# Patient Record
Sex: MALE | Race: WHITE | NOT HISPANIC OR LATINO | URBAN - METROPOLITAN AREA
[De-identification: names, ages, dates, MRNs, and addresses within clinical notes are randomized per-mention and may not be internally consistent; named-entity substitution may affect disease eponyms.]

---

## 2020-07-01 ENCOUNTER — PATIENT MESSAGE (OUTPATIENT)
Dept: ENDOCRINOLOGY | Facility: CLINIC | Age: 20
End: 2020-07-01

## 2020-07-02 NOTE — TELEPHONE ENCOUNTER
Spoke with Ollie to schedule virtual visit per Dr. Horton instructions. They were okay with this change and have an active patient portal. The virtual visit was scheduled by Suzanna, as I do not have access to override. The patient is aware of time and date of appointment and has the [employer] lobo to complete visit. They had no questions and have been given clinic contact information incase they have any future questions.

## 2020-07-08 ENCOUNTER — OFFICE VISIT (OUTPATIENT)
Dept: ENDOCRINOLOGY | Facility: CLINIC | Age: 20
End: 2020-07-08
Attending: INTERNAL MEDICINE
Payer: COMMERCIAL

## 2020-07-08 DIAGNOSIS — F41.9 ANXIETY: ICD-10-CM

## 2020-07-08 PROCEDURE — 99203 OFFICE O/P NEW LOW 30 MIN: CPT | Mod: 95,,, | Performed by: INTERNAL MEDICINE

## 2020-07-08 PROCEDURE — 99203 PR OFFICE/OUTPT VISIT, NEW, LEVL III, 30-44 MIN: ICD-10-PCS | Mod: 95,,, | Performed by: INTERNAL MEDICINE

## 2020-07-08 RX ORDER — ESTRADIOL 1 MG/1
1 TABLET ORAL 2 TIMES DAILY
Qty: 60 TABLET | Refills: 5 | Status: SHIPPED | OUTPATIENT
Start: 2020-07-08 | End: 2020-11-16

## 2020-07-08 RX ORDER — ESCITALOPRAM OXALATE 10 MG/1
TABLET ORAL DAILY
COMMUNITY
End: 2021-02-22

## 2020-07-08 RX ORDER — SPIRONOLACTONE 25 MG/1
25 TABLET ORAL 2 TIMES DAILY
Qty: 60 TABLET | Refills: 6 | Status: SHIPPED | OUTPATIENT
Start: 2020-07-08 | End: 2020-11-16

## 2020-07-08 NOTE — ASSESSMENT & PLAN NOTE
Transwoman: gender incongruence   Reviewed therapy, side effects (both wanted and unwanted), possible adverse outcomes, expectations, compliance. Reviewed limited data on fertility, expect decline over time... consider sperm banking.   Patient encouraged to continue working with her therapist during the transition process.      Will start estrogen replacement therapy    Estradiol 1 mg po bid     Will start aldactone 25 mg bid           RTO in 2- 3 months with labs .  Healthy lifestyle stressed  Discussed increased risk of dvt   Avoid prolonged immobility  D/c ert prior to any procedures

## 2020-07-08 NOTE — PROGRESS NOTES
Subjective:      Patient ID: Ollie Mccabe is a 19 y.o. male.    Chief Complaint:  Gender Identity Disorder      History of Present Illness  Was seen at [facility]       With regards to her gender incongruence care:  Queer   They/them   Would like to have their body match their identity better       Gender identity - female  Current Therapy / counselor - yes    the are supportive     Gender Surgeries - not at this time   Fertility concerns - not  interested    Has not started cross hormone therapy:      Social:  Work -  In school - [employer] --senior    Majoring in environmental biology     Family -  Aware and supportive   Relationship, orientation - in relationship with woman   She is supportive   Erections are not impatrot to the [facility]     Housing - lives alone       Anxiety is stable     New concerns today:     they are not hypermobile       Review of Systems   Constitutional: Negative for unexpected weight change.   Eyes: Negative for visual disturbance.   Respiratory: Negative for shortness of breath.    Cardiovascular: Negative for chest pain.   Gastrointestinal: Negative for abdominal pain.   Musculoskeletal: Negative for myalgias.   Skin: Negative for wound.   Neurological: Negative for headaches.   Hematological: Does not bruise/bleed easily.   Psychiatric/Behavioral: Negative for sleep disturbance.       Objective:     There were no vitals taken for this visit.  BP Readings from Last 3 Encounters:   No data found for BP     Wt Readings from Last 1 Encounters:   No data found for Wt     There is no height or weight on file to calculate BMI.      Physical Exam  Constitutional:       Appearance: Normal appearance.   Psychiatric:         Attention and Perception: Attention normal.         Speech: Speech normal.         Behavior: Behavior normal.         Thought Content: Thought content normal.         Judgment: Judgment normal.                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 Reviewed limited data on fertility, expect decline over time... consider sperm banking.   Patient encouraged to continue working with her therapist during the transition process.      Will start estrogen replacement therapy    Estradiol 1 mg po bid     Will start aldactone 25 mg bid           RTO in 2- 3 months with labs .  Healthy lifestyle stressed  Discussed increased risk of dvt   Avoid prolonged immobility  D/c ert prior to any procedures          Anxiety  ert can impact mood  F/u [facility]     The patient location is: home  The chief complaint leading to consultation is: gender    Visit type: audiovisual    Face to Face time with patient: 30 minutes of total time spent on the encounter, which includes face to face time and non-face to face time preparing to see the patient (eg, review of tests), Obtaining and/or reviewing separately obtained history, Documenting clinical information in the electronic or other health record, Independently interpreting results (not separately reported) and communicating results to the patient/family/caregiver, or Care coordination (not separately reported).         Each patient to whom he or she provides medical services by telemedicine is:  (1) informed of the relationship between the physician and patient and the respective role of any other health care provider with respect to management of the patient; and (2) notified that he or she may decline to receive medical services by telemedicine and may withdraw from such care at any  time.      Time spent with patient 30 minutes with > 50% spent in counseling

## 2020-07-08 NOTE — PATIENT INSTRUCTIONS
Thank you for completing a virtual visit with me!     Per our conversation, we will start low-dose estrogen and spironolactone.    We will plan a telemedicine or an in-clinic visit in 2-3  months with labs prior to that appointment.    My staff will contact you to schedule the above.     Please let me know if you have any other questions.    Thank you,  Reyna Horton MD

## 2020-07-08 NOTE — LETTER
July 8, 2020      Sandra Cortez MD  [address] [facility] [address] [facility]  [address]  [facility] [address]           [facility] End[facility]inology [address] [place]  [address]  [facility] [address]  Phone: [phone]  Fax: [phone]          Patient: Ollie Mccabe   MR Number: [id number]   YOB: 2000   Date of Visit: 7/8/2020       Dear Dr. Sandra Cortez:    Thank you for referring Ollie Mccabe to me for evaluation. Attached you will find relevant portions of my assessment and plan of care.    If you have questions, please do not hesitate to call me. I look forward to following Ollie Mccabe along with you.    Sincerely,    Reyna Horton MD    Enclosure  CC:  No Recipients    If you would like to receive this communication electronically, please contact [email or web address] or [phone] to request more information on [employer] access.    For providers and/or their staff who would like to refer a patient to [facility], please contact us through our one-stop-shop provider referral line, [facility], at [phone].    If you feel you have received this communication in error or would no longer like to receive these types of communications, please e-mail [email or web address]

## 2020-09-07 ENCOUNTER — PATIENT MESSAGE (OUTPATIENT)
Dept: ENDOCRINOLOGY | Facility: CLINIC | Age: 20
End: 2020-09-07

## 2020-09-14 ENCOUNTER — TELEPHONE (OUTPATIENT)
Dept: ENDOCRINOLOGY | Facility: CLINIC | Age: 20
End: 2020-09-14

## 2020-09-14 NOTE — TELEPHONE ENCOUNTER
----- Message from Ashleigh Banks MA sent at 9/11/2020  4:44 PM CDT -----  Regarding: FW: Returned Phone Call    ----- Message -----  From: Iman Lee  Sent: 9/11/2020   4:36 PM CDT  To: Sol BONILLA Staff  Subject: Returned Phone Call                              Pt returning phone call from 9-8-20 concerning lab results       [phone]

## 2020-09-16 ENCOUNTER — TELEPHONE (OUTPATIENT)
Dept: ENDOCRINOLOGY | Facility: CLINIC | Age: 20
End: 2020-09-16

## 2020-09-16 NOTE — TELEPHONE ENCOUNTER
----- Message from Diana Gonzalez sent at 9/16/2020  4:11 PM CDT -----  Pt states he missed a call nurse call him between the hours 1pm tomorrow please call back to discuss

## 2020-09-17 ENCOUNTER — PATIENT MESSAGE (OUTPATIENT)
Dept: ENDOCRINOLOGY | Facility: CLINIC | Age: 20
End: 2020-09-17

## 2020-11-11 ENCOUNTER — TELEPHONE (OUTPATIENT)
Dept: ENDOCRINOLOGY | Facility: CLINIC | Age: 20
End: 2020-11-11

## 2020-11-13 ENCOUNTER — TELEPHONE (OUTPATIENT)
Dept: ENDOCRINOLOGY | Facility: CLINIC | Age: 20
End: 2020-11-13

## 2020-11-16 ENCOUNTER — OFFICE VISIT (OUTPATIENT)
Dept: ENDOCRINOLOGY | Facility: CLINIC | Age: 20
End: 2020-11-16
Payer: COMMERCIAL

## 2020-11-16 ENCOUNTER — PATIENT MESSAGE (OUTPATIENT)
Dept: ENDOCRINOLOGY | Facility: CLINIC | Age: 20
End: 2020-11-16

## 2020-11-16 PROCEDURE — 99213 PR OFFICE/OUTPT VISIT, EST, LEVL III, 20-29 MIN: ICD-10-PCS | Mod: 95,,, | Performed by: INTERNAL MEDICINE

## 2020-11-16 PROCEDURE — 99213 OFFICE O/P EST LOW 20 MIN: CPT | Mod: 95,,, | Performed by: INTERNAL MEDICINE

## 2020-11-16 RX ORDER — ESTRADIOL 2 MG/1
2 TABLET ORAL 2 TIMES DAILY
Qty: 60 TABLET | Refills: 11 | Status: SHIPPED | OUTPATIENT
Start: 2020-11-16 | End: 2021-04-09 | Stop reason: SDUPTHER

## 2020-11-16 RX ORDER — SPIRONOLACTONE 50 MG/1
50 TABLET, FILM COATED ORAL 2 TIMES DAILY
Qty: 60 TABLET | Refills: 11 | Status: SHIPPED | OUTPATIENT
Start: 2020-11-16 | End: 2021-02-22

## 2020-11-16 NOTE — ASSESSMENT & PLAN NOTE
Transwoman: gender incongruence   Reviewed therapy, side effects (both wanted and unwanted), possible adverse outcomes, expectations, compliance. Reviewed limited data on fertility, expect decline over time... consider sperm banking.        Will increase estrogen replacement therapy    Estradiol 2 mg po bid     Will increase aldactone 50 mg bid           RTO in 3 months with labs .  Healthy lifestyle stressed  Discussed increased risk of dvt   Avoid prolonged immobility  D/c ert prior to any procedures

## 2020-11-16 NOTE — PATIENT INSTRUCTIONS
Thank you for completing a virtual visit with me!     Per our conversation your medication changes are as follows:  Increase your estradiol to 2 mg twice a day and your spironolactone to 50 mg twice a day.  I sent the new prescriptions in to the pharmacy on file.    We will plan a telemedicine or an in-clinic visit in 3 months with labs prior to that appointment.    My staff will contact you to schedule the above.     Please let me know if you have any other questions.    Thank you,  Reyna Horton MD

## 2020-11-16 NOTE — PROGRESS NOTES
Subjective:      Patient ID: Ollie Mccabe is a 20 y.o. male.    Chief Complaint:  Gender     History of Present Illness    With regards to their gender incongruence care:  Queer   They/them   Would like to have their body match their identity better        Current Therapy / counselor - yes    they are supportive     Gender Surgeries - not at this time   Fertility concerns - not  interested    Started cross hormone therapy:    Estradiol 1 mg bid  Aldactone 25 bid     Is having breast tenderness.  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   Eyes: Negative for visual disturbance.   Respiratory: Negative for shortness of breath.    Cardiovascular: Negative for chest pain.   Gastrointestinal: Negative for abdominal pain.   Musculoskeletal: Negative for myalgias.   Skin: Negative for wound.   Neurological: Negative for headaches.   Hematological: Does not bruise/bleed easily.   Psychiatric/Behavioral: Negative for sleep disturbance.       Objective:     There were no vitals taken for this visit.  BP Readings from Last 3 Encounters:   No data found for BP     Wt Readings from Last 1 Encounters:   No data found for Wt     There is no height or weight on file to calculate BMI.      Physical Exam  Constitutional:       Appearance: Normal appearance.   Psychiatric:         Attention and Perception: Attention normal.         Speech: Speech normal.         Behavior: Behavior normal.         Thought Content: Thought content normal.         Judgment: Judgment normal.                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 Reviewed limited data on fertility, expect decline over time... consider sperm banking.        Will increase estrogen replacement therapy    Estradiol 2 mg po bid     Will increase aldactone 50 mg bid           RTO in 3 months with labs .  Healthy lifestyle stressed  Discussed increased risk of dvt   Avoid prolonged immobility  D/c ert prior to any procedures          The patient location is: home  The chief complaint leading to consultation is: gender    Visit type: audiovisual    Face to Face time with patient: 30 minutes of total time spent on the encounter, which includes face to face time and non-face to face time preparing to see the patient (eg, review of tests), Obtaining and/or reviewing separately obtained history, Documenting clinical information in the electronic or other health record, Independently interpreting results (not separately reported) and communicating results to the patient/family/caregiver, or Care coordination (not separately reported).         Each patient to whom he or she provides medical services by telemedicine is:  (1) informed of the relationship between the physician and patient and the respective role of any other health care provider with respect to management of the patient; and (2) notified that he or she may decline to receive medical services by telemedicine and may withdraw from such care at any time.

## 2021-01-08 ENCOUNTER — PATIENT MESSAGE (OUTPATIENT)
Dept: ENDOCRINOLOGY | Facility: CLINIC | Age: 21
End: 2021-01-08

## 2021-02-12 LAB
ALBUMIN SERPL-MCNC: 4.4 G/DL (ref 4.1–5.2)
ALBUMIN/GLOB SERPL: 1.6 {RATIO} (ref 1.2–2.2)
ALP SERPL-CCNC: 62 IU/L (ref 39–117)
ALT SERPL-CCNC: 10 IU/L (ref 0–44)
AST SERPL-CCNC: 14 IU/L (ref 0–40)
BILIRUB SERPL-MCNC: 0.6 MG/DL (ref 0–1.2)
BUN SERPL-MCNC: 12 MG/DL (ref 6–20)
BUN/CREAT SERPL: 16 (ref 9–20)
CALCIUM SERPL-MCNC: 9.7 MG/DL (ref 8.7–10.2)
CHLORIDE SERPL-SCNC: 101 MMOL/L (ref 96–106)
CO2 SERPL-SCNC: 25 MMOL/L (ref 20–29)
CREAT SERPL-MCNC: 0.75 MG/DL (ref 0.76–1.27)
ESTRADIOL SERPL-MCNC: 278 PG/ML (ref 7.6–42.6)
GLOBULIN SER CALC-MCNC: 2.8 G/DL (ref 1.5–4.5)
GLUCOSE SERPL-MCNC: 99 MG/DL (ref 65–99)
POTASSIUM SERPL-SCNC: 4.4 MMOL/L (ref 3.5–5.2)
PROT SERPL-MCNC: 7.2 G/DL (ref 6–8.5)
SODIUM SERPL-SCNC: 141 MMOL/L (ref 134–144)
TESTOST SERPL-MCNC: 329 NG/DL (ref 264–916)

## 2021-02-22 ENCOUNTER — OFFICE VISIT (OUTPATIENT)
Dept: ENDOCRINOLOGY | Facility: CLINIC | Age: 21
End: 2021-02-22
Payer: COMMERCIAL

## 2021-02-22 DIAGNOSIS — F41.9 ANXIETY: ICD-10-CM

## 2021-02-22 PROCEDURE — 99214 OFFICE O/P EST MOD 30 MIN: CPT | Mod: 95,,, | Performed by: INTERNAL MEDICINE

## 2021-02-22 PROCEDURE — 99214 PR OFFICE/OUTPT VISIT, EST, LEVL IV, 30-39 MIN: ICD-10-PCS | Mod: 95,,, | Performed by: INTERNAL MEDICINE

## 2021-02-22 RX ORDER — SPIRONOLACTONE 100 MG/1
100 TABLET, FILM COATED ORAL 2 TIMES DAILY
Qty: 180 TABLET | Refills: 3 | Status: SHIPPED | OUTPATIENT
Start: 2021-02-22 | End: 2021-04-09 | Stop reason: SDUPTHER

## 2021-02-24 ENCOUNTER — PATIENT MESSAGE (OUTPATIENT)
Dept: ENDOCRINOLOGY | Facility: CLINIC | Age: 21
End: 2021-02-24

## 2021-03-20 ENCOUNTER — PATIENT MESSAGE (OUTPATIENT)
Dept: ENDOCRINOLOGY | Facility: CLINIC | Age: 21
End: 2021-03-20

## 2021-04-08 LAB
ALBUMIN SERPL-MCNC: 4.7 G/DL (ref 4.1–5.2)
ALBUMIN/GLOB SERPL: 1.7 {RATIO} (ref 1.2–2.2)
ALP SERPL-CCNC: 66 IU/L (ref 39–117)
ALT SERPL-CCNC: 14 IU/L (ref 0–44)
AST SERPL-CCNC: 18 IU/L (ref 0–40)
BILIRUB SERPL-MCNC: 0.4 MG/DL (ref 0–1.2)
BUN SERPL-MCNC: 16 MG/DL (ref 6–20)
BUN/CREAT SERPL: 20 (ref 9–20)
CALCIUM SERPL-MCNC: 9.8 MG/DL (ref 8.7–10.2)
CHLORIDE SERPL-SCNC: 101 MMOL/L (ref 96–106)
CO2 SERPL-SCNC: 23 MMOL/L (ref 20–29)
CREAT SERPL-MCNC: 0.81 MG/DL (ref 0.76–1.27)
ESTRADIOL SERPL-MCNC: 44.9 PG/ML (ref 7.6–42.6)
GLOBULIN SER CALC-MCNC: 2.7 G/DL (ref 1.5–4.5)
GLUCOSE SERPL-MCNC: 105 MG/DL (ref 65–99)
POTASSIUM SERPL-SCNC: 4.5 MMOL/L (ref 3.5–5.2)
PROT SERPL-MCNC: 7.4 G/DL (ref 6–8.5)
SODIUM SERPL-SCNC: 137 MMOL/L (ref 134–144)
TESTOST FREE SERPL-MCNC: 4.1 PG/ML (ref 9.3–26.5)

## 2021-04-09 ENCOUNTER — OFFICE VISIT (OUTPATIENT)
Dept: ENDOCRINOLOGY | Facility: CLINIC | Age: 21
End: 2021-04-09
Attending: INTERNAL MEDICINE
Payer: COMMERCIAL

## 2021-04-09 DIAGNOSIS — F41.9 ANXIETY: ICD-10-CM

## 2021-04-09 PROCEDURE — 99214 OFFICE O/P EST MOD 30 MIN: CPT | Mod: KX,95,, | Performed by: INTERNAL MEDICINE

## 2021-04-09 PROCEDURE — 99214 PR OFFICE/OUTPT VISIT, EST, LEVL IV, 30-39 MIN: ICD-10-PCS | Mod: KX,95,, | Performed by: INTERNAL MEDICINE

## 2021-04-09 RX ORDER — ESTRADIOL 2 MG/1
3 TABLET ORAL 2 TIMES DAILY
Qty: 90 TABLET | Refills: 11 | Status: SHIPPED | OUTPATIENT
Start: 2021-04-09 | End: 2021-08-09

## 2021-04-09 RX ORDER — SPIRONOLACTONE 100 MG/1
100 TABLET, FILM COATED ORAL 2 TIMES DAILY
Qty: 180 TABLET | Refills: 3 | Status: SHIPPED | OUTPATIENT
Start: 2021-04-09 | End: 2021-12-01 | Stop reason: SDUPTHER

## 2021-07-23 ENCOUNTER — PATIENT MESSAGE (OUTPATIENT)
Dept: ENDOCRINOLOGY | Facility: CLINIC | Age: 21
End: 2021-07-23

## 2021-08-05 ENCOUNTER — PATIENT MESSAGE (OUTPATIENT)
Dept: ENDOCRINOLOGY | Facility: CLINIC | Age: 21
End: 2021-08-05

## 2021-08-06 ENCOUNTER — OFFICE VISIT (OUTPATIENT)
Dept: ENDOCRINOLOGY | Facility: CLINIC | Age: 21
End: 2021-08-06
Attending: INTERNAL MEDICINE
Payer: COMMERCIAL

## 2021-08-06 ENCOUNTER — PATIENT MESSAGE (OUTPATIENT)
Dept: ENDOCRINOLOGY | Facility: CLINIC | Age: 21
End: 2021-08-06

## 2021-08-06 PROCEDURE — 1160F RVW MEDS BY RX/DR IN RCRD: CPT | Mod: CPTII,95,, | Performed by: INTERNAL MEDICINE

## 2021-08-06 PROCEDURE — 1159F MED LIST DOCD IN RCRD: CPT | Mod: CPTII,95,, | Performed by: INTERNAL MEDICINE

## 2021-08-06 PROCEDURE — 1160F PR REVIEW ALL MEDS BY PRESCRIBER/CLIN PHARMACIST DOCUMENTED: ICD-10-PCS | Mod: CPTII,95,, | Performed by: INTERNAL MEDICINE

## 2021-08-06 PROCEDURE — 1159F PR MEDICATION LIST DOCUMENTED IN MEDICAL RECORD: ICD-10-PCS | Mod: CPTII,95,, | Performed by: INTERNAL MEDICINE

## 2021-08-06 PROCEDURE — 99213 PR OFFICE/OUTPT VISIT, EST, LEVL III, 20-29 MIN: ICD-10-PCS | Mod: 95,,, | Performed by: INTERNAL MEDICINE

## 2021-08-06 PROCEDURE — 99213 OFFICE O/P EST LOW 20 MIN: CPT | Mod: 95,,, | Performed by: INTERNAL MEDICINE

## 2021-08-09 ENCOUNTER — PATIENT MESSAGE (OUTPATIENT)
Dept: UROLOGY | Facility: CLINIC | Age: 21
End: 2021-08-09

## 2021-08-09 RX ORDER — ESTRADIOL 2 MG/1
4 TABLET ORAL 2 TIMES DAILY
Qty: 360 TABLET | Refills: 3 | Status: SHIPPED | OUTPATIENT
Start: 2021-08-09 | End: 2021-12-01 | Stop reason: SDUPTHER

## 2021-08-21 ENCOUNTER — PATIENT MESSAGE (OUTPATIENT)
Dept: UROLOGY | Facility: CLINIC | Age: 21
End: 2021-08-21

## 2021-11-04 ENCOUNTER — OFFICE VISIT (OUTPATIENT)
Dept: URGENT CARE | Facility: CLINIC | Age: 21
End: 2021-11-04
Payer: MEDICAID

## 2021-11-04 VITALS
DIASTOLIC BLOOD PRESSURE: 71 MMHG | HEIGHT: 68 IN | SYSTOLIC BLOOD PRESSURE: 111 MMHG | HEART RATE: 77 BPM | OXYGEN SATURATION: 98 % | TEMPERATURE: 98 F | WEIGHT: 160 LBS | BODY MASS INDEX: 24.25 KG/M2 | RESPIRATION RATE: 16 BRPM

## 2021-11-04 DIAGNOSIS — T25.221A PARTIAL THICKNESS BURN OF RIGHT FOOT, INITIAL ENCOUNTER: ICD-10-CM

## 2021-11-04 DIAGNOSIS — T25.232A PARTIAL THICKNESS BURN OF TOE OF LEFT FOOT, INITIAL ENCOUNTER: Primary | ICD-10-CM

## 2021-11-04 DIAGNOSIS — T25.231A PARTIAL THICKNESS BURN OF TOE OF RIGHT FOOT, INITIAL ENCOUNTER: ICD-10-CM

## 2021-11-04 DIAGNOSIS — T25.222A PARTIAL THICKNESS BURN OF LEFT FOOT, INITIAL ENCOUNTER: ICD-10-CM

## 2021-11-04 PROCEDURE — 99203 OFFICE O/P NEW LOW 30 MIN: CPT | Mod: S$GLB,,, | Performed by: STUDENT IN AN ORGANIZED HEALTH CARE EDUCATION/TRAINING PROGRAM

## 2021-11-04 PROCEDURE — 99203 PR OFFICE/OUTPT VISIT, NEW, LEVL III, 30-44 MIN: ICD-10-PCS | Mod: S$GLB,,, | Performed by: STUDENT IN AN ORGANIZED HEALTH CARE EDUCATION/TRAINING PROGRAM

## 2021-11-04 RX ORDER — HYDROCODONE BITARTRATE AND ACETAMINOPHEN 5; 325 MG/1; MG/1
1-2 TABLET ORAL EVERY 6 HOURS PRN
Qty: 12 TABLET | Refills: 0 | Status: SHIPPED | OUTPATIENT
Start: 2021-11-04 | End: 2021-11-09

## 2021-11-04 RX ORDER — KETOROLAC TROMETHAMINE 30 MG/ML
30 INJECTION, SOLUTION INTRAMUSCULAR; INTRAVENOUS
Status: COMPLETED | OUTPATIENT
Start: 2021-11-04 | End: 2021-11-04

## 2021-11-04 RX ORDER — MUPIROCIN 20 MG/G
OINTMENT TOPICAL 3 TIMES DAILY
Qty: 30 G | Refills: 0 | Status: SHIPPED | OUTPATIENT
Start: 2021-11-04 | End: 2021-11-11

## 2021-11-04 RX ADMIN — KETOROLAC TROMETHAMINE 30 MG: 30 INJECTION, SOLUTION INTRAMUSCULAR; INTRAVENOUS at 10:11

## 2021-11-23 ENCOUNTER — PATIENT MESSAGE (OUTPATIENT)
Dept: ENDOCRINOLOGY | Facility: CLINIC | Age: 21
End: 2021-11-23
Payer: MEDICAID

## 2021-11-24 ENCOUNTER — LAB VISIT (OUTPATIENT)
Dept: LAB | Facility: OTHER | Age: 21
End: 2021-11-24
Attending: INTERNAL MEDICINE
Payer: MEDICAID

## 2021-11-24 LAB
ALBUMIN SERPL BCP-MCNC: 4.2 G/DL (ref 3.5–5.2)
ALP SERPL-CCNC: 56 U/L (ref 55–135)
ALT SERPL W/O P-5'-P-CCNC: 10 U/L (ref 10–44)
ANION GAP SERPL CALC-SCNC: 8 MMOL/L (ref 8–16)
AST SERPL-CCNC: 15 U/L (ref 10–40)
BILIRUB SERPL-MCNC: 0.5 MG/DL (ref 0.1–1)
BUN SERPL-MCNC: 12 MG/DL (ref 6–20)
CALCIUM SERPL-MCNC: 10.1 MG/DL (ref 8.7–10.5)
CHLORIDE SERPL-SCNC: 102 MMOL/L (ref 95–110)
CO2 SERPL-SCNC: 26 MMOL/L (ref 23–29)
CREAT SERPL-MCNC: 0.9 MG/DL (ref 0.5–1.4)
EST. GFR  (AFRICAN AMERICAN): >60 ML/MIN/1.73 M^2
EST. GFR  (NON AFRICAN AMERICAN): >60 ML/MIN/1.73 M^2
ESTRADIOL SERPL-MCNC: 180 PG/ML (ref 11–44)
GLUCOSE SERPL-MCNC: 97 MG/DL (ref 70–110)
POTASSIUM SERPL-SCNC: 4.5 MMOL/L (ref 3.5–5.1)
PROT SERPL-MCNC: 7.9 G/DL (ref 6–8.4)
SODIUM SERPL-SCNC: 136 MMOL/L (ref 136–145)
TESTOST SERPL-MCNC: 20 NG/DL (ref 304–1227)

## 2021-11-24 PROCEDURE — 84403 ASSAY OF TOTAL TESTOSTERONE: CPT | Performed by: INTERNAL MEDICINE

## 2021-11-24 PROCEDURE — 36415 COLL VENOUS BLD VENIPUNCTURE: CPT | Performed by: INTERNAL MEDICINE

## 2021-11-24 PROCEDURE — 80053 COMPREHEN METABOLIC PANEL: CPT | Performed by: INTERNAL MEDICINE

## 2021-11-24 PROCEDURE — 82670 ASSAY OF TOTAL ESTRADIOL: CPT | Performed by: INTERNAL MEDICINE

## 2021-12-01 ENCOUNTER — OFFICE VISIT (OUTPATIENT)
Dept: ENDOCRINOLOGY | Facility: CLINIC | Age: 21
End: 2021-12-01
Payer: MEDICAID

## 2021-12-01 PROCEDURE — 99213 OFFICE O/P EST LOW 20 MIN: CPT | Mod: 95,KX,, | Performed by: INTERNAL MEDICINE

## 2021-12-01 PROCEDURE — 99213 PR OFFICE/OUTPT VISIT, EST, LEVL III, 20-29 MIN: ICD-10-PCS | Mod: 95,KX,, | Performed by: INTERNAL MEDICINE

## 2021-12-01 RX ORDER — PROGESTERONE 100 MG/1
100 CAPSULE ORAL NIGHTLY
Qty: 90 CAPSULE | Refills: 3 | Status: SHIPPED | OUTPATIENT
Start: 2021-12-01 | End: 2022-05-13

## 2021-12-01 RX ORDER — SPIRONOLACTONE 100 MG/1
100 TABLET, FILM COATED ORAL 2 TIMES DAILY
Qty: 180 TABLET | Refills: 3 | Status: SHIPPED | OUTPATIENT
Start: 2021-12-01 | End: 2022-05-13

## 2021-12-01 RX ORDER — ESTRADIOL 2 MG/1
4 TABLET ORAL 2 TIMES DAILY
Qty: 360 TABLET | Refills: 3 | Status: SHIPPED | OUTPATIENT
Start: 2021-12-01 | End: 2022-05-13

## 2022-03-29 ENCOUNTER — PATIENT MESSAGE (OUTPATIENT)
Dept: ENDOCRINOLOGY | Facility: CLINIC | Age: 22
End: 2022-03-29
Payer: MEDICAID

## 2022-05-02 ENCOUNTER — PATIENT MESSAGE (OUTPATIENT)
Dept: ENDOCRINOLOGY | Facility: CLINIC | Age: 22
End: 2022-05-02
Payer: MEDICAID

## 2022-05-11 ENCOUNTER — LAB VISIT (OUTPATIENT)
Dept: LAB | Facility: OTHER | Age: 22
End: 2022-05-11
Attending: INTERNAL MEDICINE
Payer: MEDICAID

## 2022-05-11 LAB
ALBUMIN SERPL BCP-MCNC: 4.1 G/DL (ref 3.5–5.2)
ALP SERPL-CCNC: 52 U/L (ref 55–135)
ALT SERPL W/O P-5'-P-CCNC: 9 U/L (ref 10–44)
ANION GAP SERPL CALC-SCNC: 9 MMOL/L (ref 8–16)
AST SERPL-CCNC: 16 U/L (ref 10–40)
BILIRUB SERPL-MCNC: 0.6 MG/DL (ref 0.1–1)
BUN SERPL-MCNC: 17 MG/DL (ref 6–20)
CALCIUM SERPL-MCNC: 9.6 MG/DL (ref 8.7–10.5)
CHLORIDE SERPL-SCNC: 105 MMOL/L (ref 95–110)
CO2 SERPL-SCNC: 24 MMOL/L (ref 23–29)
CREAT SERPL-MCNC: 0.8 MG/DL (ref 0.5–1.4)
EST. GFR  (AFRICAN AMERICAN): >60 ML/MIN/1.73 M^2
EST. GFR  (NON AFRICAN AMERICAN): >60 ML/MIN/1.73 M^2
ESTRADIOL SERPL-MCNC: 81 PG/ML (ref 11–44)
GLUCOSE SERPL-MCNC: 86 MG/DL (ref 70–110)
POTASSIUM SERPL-SCNC: 4.4 MMOL/L (ref 3.5–5.1)
PROT SERPL-MCNC: 7.4 G/DL (ref 6–8.4)
SODIUM SERPL-SCNC: 138 MMOL/L (ref 136–145)
TESTOST SERPL-MCNC: 15 NG/DL (ref 304–1227)
TSH SERPL DL<=0.005 MIU/L-ACNC: 1.61 UIU/ML (ref 0.4–4)

## 2022-05-11 PROCEDURE — 82670 ASSAY OF TOTAL ESTRADIOL: CPT | Performed by: INTERNAL MEDICINE

## 2022-05-11 PROCEDURE — 84443 ASSAY THYROID STIM HORMONE: CPT | Performed by: INTERNAL MEDICINE

## 2022-05-11 PROCEDURE — 84403 ASSAY OF TOTAL TESTOSTERONE: CPT | Performed by: INTERNAL MEDICINE

## 2022-05-11 PROCEDURE — 80053 COMPREHEN METABOLIC PANEL: CPT | Performed by: INTERNAL MEDICINE

## 2022-05-11 PROCEDURE — 36415 COLL VENOUS BLD VENIPUNCTURE: CPT | Performed by: INTERNAL MEDICINE

## 2022-05-13 ENCOUNTER — OFFICE VISIT (OUTPATIENT)
Dept: ENDOCRINOLOGY | Facility: CLINIC | Age: 22
End: 2022-05-13
Attending: INTERNAL MEDICINE
Payer: MEDICAID

## 2022-05-13 PROCEDURE — 1160F PR REVIEW ALL MEDS BY PRESCRIBER/CLIN PHARMACIST DOCUMENTED: ICD-10-PCS | Mod: CPTII,95,, | Performed by: INTERNAL MEDICINE

## 2022-05-13 PROCEDURE — 1159F PR MEDICATION LIST DOCUMENTED IN MEDICAL RECORD: ICD-10-PCS | Mod: CPTII,95,, | Performed by: INTERNAL MEDICINE

## 2022-05-13 PROCEDURE — 99213 PR OFFICE/OUTPT VISIT, EST, LEVL III, 20-29 MIN: ICD-10-PCS | Mod: 95,KX,, | Performed by: INTERNAL MEDICINE

## 2022-05-13 PROCEDURE — 1159F MED LIST DOCD IN RCRD: CPT | Mod: CPTII,95,, | Performed by: INTERNAL MEDICINE

## 2022-05-13 PROCEDURE — 99213 OFFICE O/P EST LOW 20 MIN: CPT | Mod: 95,KX,, | Performed by: INTERNAL MEDICINE

## 2022-05-13 PROCEDURE — 1160F RVW MEDS BY RX/DR IN RCRD: CPT | Mod: CPTII,95,, | Performed by: INTERNAL MEDICINE

## 2022-05-13 RX ORDER — SPIRONOLACTONE 100 MG/1
100 TABLET, FILM COATED ORAL 2 TIMES DAILY
Qty: 180 TABLET | Refills: 3 | Status: SHIPPED | OUTPATIENT
Start: 2022-05-13

## 2022-05-13 RX ORDER — ESTRADIOL 2 MG/1
4 TABLET ORAL 2 TIMES DAILY
Qty: 360 TABLET | Refills: 3 | Status: SHIPPED | OUTPATIENT
Start: 2022-05-13

## 2022-05-13 RX ORDER — PROGESTERONE 100 MG/1
100 CAPSULE ORAL NIGHTLY
Qty: 90 CAPSULE | Refills: 3 | Status: SHIPPED | OUTPATIENT
Start: 2022-05-13 | End: 2023-05-13

## 2022-05-13 NOTE — PROGRESS NOTES
Subjective:      Patient ID: Ollie Mccabe is a 21 y.o. male.    Chief Complaint:  Gender     History of Present Illness    With regards to their gender incongruence care:  Queer   They/them   Would like to have their body match their identity better        Gender Surgeries - would like to look into orchiectomy    Fertility concerns - not  interested    Started cross hormone therapy:    Estradiol 4 mg bid - SL   Aldactone 100  bid   progesterone 100 qhs - started 12/21    Is having breast tenderness/growth.  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  BP Readings from Last 3 Encounters:   11/04/21 111/71     Wt Readings from Last 1 Encounters:   11/04/21 0911 72.6 kg (160 lb)     There is no height or weight on file to calculate BMI.      Physical Exam  Constitutional:       Appearance: Normal appearance.   Psychiatric:         Attention and Perception: Attention normal.         Speech: Speech normal.         Behavior: Behavior normal.         Thought Content: Thought content normal.         Judgment: Judgment normal.                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    Continue regimen.  She will find a new provider in [place].  Return p.r.n.        The patient location is: home  The chief complaint leading to consultation is: gender    Visit type: audiovisual    Face to Face time with patient: 30 minutes of total time spent on the encounter, which includes face to face time and non-face to face time preparing to see the patient (eg, review of tests), Obtaining and/or reviewing separately obtained history, Documenting clinical information in the electronic or other health record, Independently interpreting results (not separately reported) and communicating results to the patient/family/caregiver, or Care coordination (not separately reported).         Each patient to whom he or she provides medical services by telemedicine is:  (1) informed of the relationship between the physician and patient and the respective role of any other health care provider with respect to management of the patient; and (2) notified that he or she may decline to receive medical services by telemedicine and may withdraw from such care at any time.

## 2022-05-13 NOTE — ASSESSMENT & PLAN NOTE
Transwoman: gender incongruence   Reviewed therapy, side effects (both wanted and unwanted), possible adverse outcomes, expectations, compliance.    Continue regimen.  She will find a new provider in [place].  Return p.r.n.

## 2022-06-12 ENCOUNTER — HOSPITAL ENCOUNTER (EMERGENCY)
Facility: OTHER | Age: 22
Discharge: HOME | End: 2022-06-12
Attending: EMERGENCY MEDICINE
Payer: MEDICAID

## 2022-06-12 ENCOUNTER — NURSE TRIAGE (OUTPATIENT)
Dept: ADMINISTRATIVE | Facility: CLINIC | Age: 22
End: 2022-06-12
Payer: MEDICAID

## 2022-06-12 VITALS
OXYGEN SATURATION: 100 % | RESPIRATION RATE: 17 BRPM | BODY MASS INDEX: 24.33 KG/M2 | WEIGHT: 160 LBS | SYSTOLIC BLOOD PRESSURE: 117 MMHG | HEART RATE: 85 BPM | TEMPERATURE: 98 F | DIASTOLIC BLOOD PRESSURE: 73 MMHG

## 2022-06-12 DIAGNOSIS — Z03.821 SUSPECTED INGESTED FOREIGN BODY NOT FOUND AFTER OBSERVATION: Primary | ICD-10-CM

## 2022-06-12 PROCEDURE — 99284 EMERGENCY DEPT VISIT MOD MDM: CPT | Mod: 25

## 2022-06-13 NOTE — ED TRIAGE NOTES
Pt. Arrived to the ER complaining of swallowing the top of beer can while drinking beer. Pt denies shortness of breath or sore throat.

## 2022-06-13 NOTE — TELEPHONE ENCOUNTER
Patient states she accidentally swallowed a soda can tab.  Patient states that she feels a sensation in her back.    Care Advice given per Swallowed Foreign Body (Adult) Guideline. Patient advised to Go to ED Now. Patient declined Care Advice and abruptly ended call.      Reason for Disposition   Sharp object  (e.g., needle, nail, safety pin, toothpick, bone, bottle cap, pull tab, dental bridge work)     (Exception: tiny chips of glass generally pass without any symptoms)    Additional Information   Negative: Any difficulty breathing (e.g., coughing, wheezing or stridor)   Negative: Sounds like a life-threatening emergency to the triager   Negative: Choked on or inhaled food or foreign body (but did not swallow it)   Negative: Symptoms of blocked esophagus (e.g., can't swallow normal secretions, drooling)   Negative: Symptoms of FB stuck in throat or esophagus (e.g., continued pain in throat or chest, FB sensation, blood-tinged saliva) (Exception: pill stuck)   Negative: Can't swallow water or bread    Protocols used: ST SWALLOWED FOREIGN BODY-A-AH

## 2022-06-13 NOTE — ED PROVIDER NOTES
Source of History:  Patient     Chief complaint:  Swallowed Foreign Body (Swallowed one aluminum can tab about one hour ago./Pt denies any discomfort but would like to make sure everything will be okay.)      HPI:  Ollie Mccabe is a 21 y.o. male who identifies as they presents the ED for evaluation of possible swallowed foreign body.  Patient reports that they took the top off of a soda can.  Drink later on after forgetting this and were concerned about possible ingestion.  They do report some throat discomfort with swallowing which prompted them to be concerned.  Denies any additional complaints at this time.  They are here for reassurance.    This is the extent to the patients complaints today here in the emergency department.    ROS: As per HPI and below:  Constitutional: No fever.  No chills.  Eyes: No visual changes.   ENT: No sore throat. No ear pain. Throat discomfort; no difficulty of swallowing.   Cardiac: no chest pain  Respiratory: no shortness or breath  Abdomen: no abdominal pain  Urinary: No abnormal urination.  MSK: no arthralgias   Integument: No rashes or lesions.    Review of patient's allergies indicates:   Allergen Reactions    Penicillins Hives       PMH:  As per HPI and below:  Past Medical History:   Diagnosis Date    Anxiety      No past surgical history on file.    Social History     Tobacco Use    Smoking status: Never Smoker    Smokeless tobacco: Never Used   Substance Use Topics    Alcohol use: Yes     Comment: occ    Drug use: Not Currently       Physical Exam:    /73 (BP Location: Left arm, Patient Position: Sitting)   Pulse 85   Temp 97.6 °F (36.4 °C) (Oral)   Resp 17   Wt 72.6 kg (160 lb)   SpO2 100%   BMI 24.33 kg/m²   Nursing note and vital signs reviewed.  Constitutional: No acute distress. Anxious appearing  Eyes: No conjunctival injection.  Extraocular muscles are intact.  ENT: Normal phonation. No stridor; no edema, air way with no signs of  injury  Cardia: regular rate and rhythm  Respiratory: lungs CTA  Abdomen: soft and nontender  Musculoskeletal: FROM of all extremities  Skin: No rashes seen.  Good turgor.  No abrasions.  No ecchymoses.  Psych: Appropriate, conversant.        I decided to obtain the patient's medical records.      Imaging Results          X-Ray Chest AP Portable (Final result)  Result time 06/12/22 21:44:23    Final result by Gaston Vyas MD (06/12/22 21:44:23)                 Impression:      No acute process.      Electronically signed by: Gaston Vyas MD  Date:    06/12/2022  Time:    21:44             Narrative:    EXAMINATION:  XR CHEST AP PORTABLE    CLINICAL HISTORY:  Foreign body of alimentary tract, part unspecified, initial encounter    TECHNIQUE:  Single frontal view of the chest was performed.    COMPARISON:  None    FINDINGS:  The trachea is unremarkable.  The cardiothymic silhouette is within normal limits.  The hemidiaphragms are unremarkable.  There are no pleural effusions.  There is no evidence of a pneumothorax.  There is no evidence of pneumomediastinum.  No airspace opacity is present.  No radiopaque foreign body is identified.  The osseous structures are unremarkable.                               X-Ray Abdomen AP 1 View (KUB) (Final result)  Result time 06/12/22 21:42:50    Final result by Tracey Marie MD (06/12/22 21:42:50)                 Impression:      Nonspecific bowel gas pattern.      Electronically signed by: Tracey Marie  Date:    06/12/2022  Time:    21:42             Narrative:    EXAMINATION:  ABDOMEN AP    CLINICAL HISTORY:  Foreign body of alimentary tract, part unspecified, initial encounter    TECHNIQUE:  Abdomen AP one view    COMPARISON:  None.    FINDINGS:  Single AP view of the abdomen demonstrates a nonspecific bowel gas pattern. No dilated loops small bowel or air-fluid levels are detected.  No definite radiopaque foreign body is detected.  There is mild levoscoliosis.                                 MDM:    21 y.o.  presents the ED for possible swallowed foreign body.  Reported some initial throat discomfort however has since improved.  Physical exam is grossly unremarkable.    Differential diagnosis:  Foreign body of the alimentary tract, suspected foreign body, foreign body sensation    ED management:  Given timeline of suspected ingestion KUB obtained.  No visualized radiopaque foreign body at this time visualized by myself per radiologist.  As patient was still concerned about the discomfort chest x-ray was obtained with visualization of this with no retained foreign body.  Patient was reassured after discussing these negative x-rays.  Incidental finding of some mild curvature changes of the lumbar spine.    The encounter diagnosis was Suspected ingested foreign body not found after observation. Strict instructions to follow up with primary care physician or reference provided for further assessment and evaluation. Given instructions to return for any acute symptoms and verbalized understanding of this medical plan.                   Diagnostic Impression:    1. Suspected ingested foreign body not found after observation         ED Disposition Condition    Discharge Stable          ED Prescriptions     None        Follow-up Information     Follow up With Specialties Details Why Contact Info    Sandra Menjivar MD Internal Medicine Go to  As needed [address]  [facility] [address]  [phone]            Please pardon typos or dictation errors, as this note was transcribed using M*Modal fluency direct dictation software.      MIREYA Limon  06/14/22 [id number]